# Patient Record
Sex: MALE | Race: ASIAN | NOT HISPANIC OR LATINO | URBAN - METROPOLITAN AREA
[De-identification: names, ages, dates, MRNs, and addresses within clinical notes are randomized per-mention and may not be internally consistent; named-entity substitution may affect disease eponyms.]

---

## 2018-11-15 ENCOUNTER — EMERGENCY (EMERGENCY)
Facility: HOSPITAL | Age: 33
LOS: 1 days | Discharge: HOME | End: 2018-11-15
Attending: EMERGENCY MEDICINE
Payer: COMMERCIAL

## 2018-11-15 VITALS
HEIGHT: 65 IN | DIASTOLIC BLOOD PRESSURE: 105 MMHG | HEART RATE: 102 BPM | SYSTOLIC BLOOD PRESSURE: 146 MMHG | TEMPERATURE: 98 F | RESPIRATION RATE: 16 BRPM | WEIGHT: 212.97 LBS

## 2018-11-15 VITALS
HEART RATE: 106 BPM | OXYGEN SATURATION: 98 % | RESPIRATION RATE: 16 BRPM | SYSTOLIC BLOOD PRESSURE: 155 MMHG | TEMPERATURE: 98 F | DIASTOLIC BLOOD PRESSURE: 100 MMHG

## 2018-11-15 PROCEDURE — 99284 EMERGENCY DEPT VISIT MOD MDM: CPT

## 2018-11-15 PROCEDURE — 99283 EMERGENCY DEPT VISIT LOW MDM: CPT

## 2018-11-15 RX ORDER — LOSARTAN POTASSIUM 100 MG/1
50 TABLET, FILM COATED ORAL DAILY
Qty: 0 | Refills: 0 | Status: DISCONTINUED | OUTPATIENT
Start: 2018-11-15 | End: 2018-11-19

## 2018-11-15 RX ORDER — LOSARTAN POTASSIUM 100 MG/1
0 TABLET, FILM COATED ORAL
Qty: 0 | Refills: 0 | COMMUNITY

## 2018-11-15 RX ORDER — AMLODIPINE BESYLATE 2.5 MG/1
0 TABLET ORAL
Qty: 0 | Refills: 0 | COMMUNITY

## 2018-11-15 RX ORDER — AMLODIPINE BESYLATE 2.5 MG/1
5 TABLET ORAL ONCE
Qty: 0 | Refills: 0 | Status: COMPLETED | OUTPATIENT
Start: 2018-11-15 | End: 2018-11-15

## 2018-11-15 RX ADMIN — LOSARTAN POTASSIUM 50 MILLIGRAM(S): 100 TABLET, FILM COATED ORAL at 10:33

## 2018-11-15 RX ADMIN — AMLODIPINE BESYLATE 5 MILLIGRAM(S): 2.5 TABLET ORAL at 10:33

## 2018-11-15 NOTE — ED ADULT NURSE NOTE - NSIMPLEMENTINTERV_GEN_ALL_ED
Implemented All Universal Safety Interventions:  [place] to call system. Call bell, personal items and telephone within reach. Instruct patient to call for assistance. Room bathroom lighting operational. Non-slip footwear when patient is off stretcher. Physically safe environment: no spills, clutter or unnecessary equipment. Stretcher in lowest position, wheels locked, appropriate side rails in place.

## 2018-11-15 NOTE — ED PROVIDER NOTE - PLAN OF CARE
1. Follow up with employee health tomorrow [phone]. Additionally please follow up with your PMD in the next 1-2 days.  2. Rest, stay hydrated. Continue all your current home medications as previously prescribed.   3. Return to the ED immediately if you develop any fever/chills, weakness, nausea/vomiting, abdominal pain or any other concerning symptoms.

## 2018-11-15 NOTE — ED PROVIDER NOTE - CARE PLAN
Principal Discharge DX:	Needle stick injury of finger of left hand, initial encounter  Assessment and plan of treatment:	1. Follow up with employee health tomorrow [phone]. Additionally please follow up with your PMD in the next 1-2 days.  2. Rest, stay hydrated. Continue all your current home medications as previously prescribed.   3. Return to the ED immediately if you develop any fever/chills, weakness, nausea/vomiting, abdominal pain or any other concerning symptoms.

## 2018-11-15 NOTE — ED PROVIDER NOTE - MEDICAL DECISION MAKING DETAILS
Attending MD Perea: 34 yo male with PMH for HTN works as a PCA on [employer], he was withdrawing butterfly and was try close safety with one hand and lost  and needle poked in L wrist.  Puncture wound present.  No active bleeding. Incident occurred at 9am.

## 2018-11-15 NOTE — ED ADULT NURSE REASSESSMENT NOTE - NS ED NURSE REASSESS COMMENT FT1
Pt's B/P 155/100, pulse 106, Dr. Baudilio Wu informed, pt denies headache, dizziness, vision changes.  Pt discharged from the ER, reports he's returning to work on [address] as per MD.  Pt reports he will have his b/p  and pulse checked on [address].  No acute distress noted.  Pt left under stable condition.

## 2018-11-15 NOTE — ED PROVIDER NOTE - ATTENDING CONTRIBUTION TO CARE
Attending MD Perea:   I personally have seen and examined this patient.  Physician assistant note reviewed and agree on plan of care and except where noted.  See MDM for details.

## 2018-11-15 NOTE — ED PROVIDER NOTE - SKIN, MLM
+small puncture wound to dorsum of radial wrist. Otherwise, skin normal color for race, warm, dry and intact. No evidence of rash.

## 2018-11-15 NOTE — ED PROVIDER NOTE - OBJECTIVE STATEMENT
34 yo male presents to ED c/o needlestick. 32 yo male PMHx HTN presents to ED c/o needlestick to L wrist this AM.    Tetanus UTD. 32 yo male PMHx HTN presents to ED c/o needlestick to L wrist this AM. 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 32 yo male PMHx HTN presents to ED c/o needlestick to L wrist this AM. 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  Tetanus UTD. 34 yo male PMHx HTN presents to ED c/o needlestick to L wrist this AM. 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 Noted minimal blood from his wrist, now has small puncture wound. States all his vaccines are up to date. Denies abdominal pain, fever/chills, cp, sob, dizziness, weakness, fatigue, n/v.  Tetanus UTD.

## 2018-11-15 NOTE — ED ADULT NURSE NOTE - OBJECTIVE STATEMENT
4 yo male PMHx HTN presents to ED c/o needlestick to L wrist this AM. 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  Pt reports he was drawing blood from a patient using a butterfly needle and while retracting the needle with his one hand he lost  and was stuck in his left wrist.  Pt reports he washed the area immediately.  Tetanus UTD.  Puncture wound present.  No active bleeding.

## 2018-11-15 NOTE — ED PROVIDER NOTE - PROGRESS NOTE DETAILS
Obtained consent for HIV testing from pt. Pt offered prophylactic PEP but refused. Source patient's chart reviewed, no HIV testing documented. Appropriate packet given to patient to give to nurse care taking for source patient. - Baudilio Wu PA-C Obtained consent for HIV testing from pt. Pt offered prophylactic PEP but refused. Source patient: MRN: [id number]. Appropriate packet given to patient to give to nurse care taking for source patient. MD aware. - Baudilio Wu PA-C Obtained consent for HIV testing from pt. Pt offered prophylactic PEP but refused. Source patient: MRN: [id number]. Appropriate packet given to patient to give to nurse care taking for source patient. Patient advised he must f/u with [facility] tomorrow, number provided. MD aware. - Baudilio Wu PA-C Obtained consent for HIV testing from pt. Pt offered prophylactic PEP but refused. Source patient: MRN: [id number]. Appropriate packet given to patient to give to nurse care taking for source patient. Patient advised he must f/u with [facility] tomorrow, number provided. HTN noted, patient did not take his home medication this AM and forgot them at home to take now. Gave home PO medication dosage. MD aware. - Baudilio Wu PA-C

## 2019-01-14 PROBLEM — I10 ESSENTIAL (PRIMARY) HYPERTENSION: Chronic | Status: ACTIVE | Noted: 2018-11-15

## 2019-01-15 PROBLEM — Z00.00 ENCOUNTER FOR PREVENTIVE HEALTH EXAMINATION: Status: ACTIVE | Noted: 2019-01-15

## 2019-01-18 ENCOUNTER — TRANSCRIPTION ENCOUNTER (OUTPATIENT)
Age: 34
End: 2019-01-18

## 2019-01-18 ENCOUNTER — APPOINTMENT (OUTPATIENT)
Dept: ENDOCRINOLOGY | Facility: CLINIC | Age: 34
End: 2019-01-18
Payer: COMMERCIAL

## 2019-01-18 ENCOUNTER — LABORATORY RESULT (OUTPATIENT)
Age: 34
End: 2019-01-18

## 2019-01-18 VITALS
SYSTOLIC BLOOD PRESSURE: 111 MMHG | BODY MASS INDEX: 33.22 KG/M2 | TEMPERATURE: 98.1 F | HEART RATE: 101 BPM | OXYGEN SATURATION: 98 % | HEIGHT: 64.57 IN | DIASTOLIC BLOOD PRESSURE: 72 MMHG | WEIGHT: 197 LBS

## 2019-01-18 DIAGNOSIS — Z82.49 FAMILY HISTORY OF ISCHEMIC HEART DISEASE AND OTHER DISEASES OF THE CIRCULATORY SYSTEM: ICD-10-CM

## 2019-01-18 DIAGNOSIS — Z87.19 PERSONAL HISTORY OF OTHER DISEASES OF THE DIGESTIVE SYSTEM: ICD-10-CM

## 2019-01-18 DIAGNOSIS — N20.0 CALCULUS OF KIDNEY: ICD-10-CM

## 2019-01-18 DIAGNOSIS — Z86.39 PERSONAL HISTORY OF OTHER ENDOCRINE, NUTRITIONAL AND METABOLIC DISEASE: ICD-10-CM

## 2019-01-18 LAB — GLUCOSE BLDC GLUCOMTR-MCNC: 213

## 2019-01-18 PROCEDURE — 36415 COLL VENOUS BLD VENIPUNCTURE: CPT

## 2019-01-18 PROCEDURE — 99204 OFFICE O/P NEW MOD 45 MIN: CPT | Mod: 25

## 2019-01-18 RX ORDER — GEMFIBROZIL 600 MG/1
600 TABLET, FILM COATED ORAL
Refills: 0 | Status: ACTIVE | COMMUNITY

## 2019-01-18 NOTE — ASSESSMENT
[FreeTextEntry1] : This is a 33-year-old male with newly diagnosed uncontrolled type 2 diabetes mellitus, obesity, hypertriglyceridemia-induced pancreatitis, hypertension, hyperlipidemia, here for initial visit. \par 1. Type 2 diabetes mellitus\par -continue with basal/bolus insulin for now\par -send blood glucose logs to the office weekly for adjustments in insulin doses\par -Appointment with CDE next week\par -He is interested in Dexcom, will inquire about coverage\par -Signs, symptoms, and treatment of hypoglycemia were reviewed. He was provided with glucose tablets\par -waiting for hemoglobin A1c from PCPs office\par -advised to self monitor his blood glucose 4 times a day\par -Will check 24 urine cortisol to evaluate for Cushing syndrome given hypertension, obesity, type 2 diabetes mellitus\par -wi'll consider initiating metformin after review of blood work\par -check CMP, urine microalbumin, islet cell antibodies, agustin antibodies\par -avoid GLP1 agonist and DPP4 inhibitor with history of pancreatitis\par -referred to ophthalmologist to evaluate for retinopathy\par 2. Hypertriglyceridemia/HLD\par -check lipid panel\par 3.HTN \par -controlled  [Carbohydrate Consistent Diet] : carbohydrate consistent diet [Hypoglycemia Management] : hypoglycemia management [Importance of Diet and Exercise] : importance of diet and exercise to improve glycemic control, achieve weight loss and improve cardiovascular health [Retinopathy Screening] : Patient was referred to ophthalmology for retinopathy screening

## 2019-01-18 NOTE — HISTORY OF PRESENT ILLNESS
[FreeTextEntry1] : CC: Diabetes\par This is a 33-year-old male with newly diagnosed uncontrolled type 2 diabetes mellitus, obesity, hypertriglyceridemia-induced pancreatitis, hypertension, hyperlipidemia, here for initial visit. He was hospitalized at [facility] from January 4, 2019- January 11, 2019 for pancreatitis requiring ICU stay. He denies a prior history of hyperlipidemia or diabetes mellitus. His last physical exam before this hospitalization was 2 years ago.\par He was started on basal/bolus insulin in the hospital. He is currently on Lantus 30 units qhs and Humalog 120-150 3 units, 150-200 6 units, 201-250 8 units. He denies hypoglycemia. He denies microvascular complications from diabetes.

## 2019-01-18 NOTE — REVIEW OF SYSTEMS
[Recent Weight Loss (___ Lbs)] : recent [unfilled] ~Ulb weight loss [All other systems negative] : All other systems negative

## 2019-01-21 LAB
ALBUMIN SERPL ELPH-MCNC: 4.8 G/DL
ALP BLD-CCNC: 160 U/L
ALT SERPL-CCNC: 33 U/L
ANION GAP SERPL CALC-SCNC: 14 MMOL/L
AST SERPL-CCNC: 30 U/L
BILIRUB SERPL-MCNC: 1.1 MG/DL
BUN SERPL-MCNC: 20 MG/DL
CALCIUM SERPL-MCNC: 9.6 MG/DL
CHLORIDE SERPL-SCNC: 102 MMOL/L
CHOLEST SERPL-MCNC: 233 MG/DL
CHOLEST/HDLC SERPL: 10.1 RATIO
CO2 SERPL-SCNC: 23 MMOL/L
CREAT SERPL-MCNC: 1.02 MG/DL
CREAT SPEC-SCNC: 203 MG/DL
GLUCOSE SERPL-MCNC: 181 MG/DL
HDLC SERPL-MCNC: 23 MG/DL
LDLC SERPL CALC-MCNC: 142 MG/DL
MICROALBUMIN 24H UR DL<=1MG/L-MCNC: 4.6 MG/DL
MICROALBUMIN/CREAT 24H UR-RTO: 23 MG/G
POTASSIUM SERPL-SCNC: 4.7 MMOL/L
PROT SERPL-MCNC: 7.6 G/DL
SODIUM SERPL-SCNC: 139 MMOL/L
TRIGL SERPL-MCNC: 341 MG/DL

## 2019-01-22 ENCOUNTER — APPOINTMENT (OUTPATIENT)
Dept: ENDOCRINOLOGY | Facility: CLINIC | Age: 34
End: 2019-01-22
Payer: COMMERCIAL

## 2019-01-22 PROCEDURE — 99215 OFFICE O/P EST HI 40 MIN: CPT

## 2019-01-23 LAB
GAD65 AB SER-MCNC: 0 NMOL/L
PANC ISLET CELL AB SER QL: NORMAL

## 2019-01-28 LAB
CORTIS 24H UR-MCNC: 24 H
CORTIS 24H UR-MRATE: 20 MCG/24 H
SPECIMEN VOL 24H UR: 1450 ML

## 2019-02-05 ENCOUNTER — APPOINTMENT (OUTPATIENT)
Dept: ENDOCRINOLOGY | Facility: CLINIC | Age: 34
End: 2019-02-05
Payer: COMMERCIAL

## 2019-02-05 VITALS — WEIGHT: 200.5 LBS | HEIGHT: 65 IN | BODY MASS INDEX: 33.41 KG/M2

## 2019-02-05 LAB — GLUCOSE BLDC GLUCOMTR-MCNC: 99

## 2019-02-05 PROCEDURE — 99215 OFFICE O/P EST HI 40 MIN: CPT

## 2019-02-14 ENCOUNTER — NON-APPOINTMENT (OUTPATIENT)
Age: 34
End: 2019-02-14

## 2019-02-14 ENCOUNTER — APPOINTMENT (OUTPATIENT)
Dept: OPHTHALMOLOGY | Facility: CLINIC | Age: 34
End: 2019-02-14
Payer: COMMERCIAL

## 2019-02-14 PROCEDURE — 92004 COMPRE OPH EXAM NEW PT 1/>: CPT

## 2019-02-25 ENCOUNTER — APPOINTMENT (OUTPATIENT)
Dept: OPHTHALMOLOGY | Facility: CLINIC | Age: 34
End: 2019-02-25

## 2019-03-01 ENCOUNTER — APPOINTMENT (OUTPATIENT)
Dept: ENDOCRINOLOGY | Facility: CLINIC | Age: 34
End: 2019-03-01

## 2019-03-29 ENCOUNTER — APPOINTMENT (OUTPATIENT)
Dept: ENDOCRINOLOGY | Facility: CLINIC | Age: 34
End: 2019-03-29

## 2019-05-03 ENCOUNTER — APPOINTMENT (OUTPATIENT)
Dept: ENDOCRINOLOGY | Facility: CLINIC | Age: 34
End: 2019-05-03

## 2019-08-15 ENCOUNTER — TRANSCRIPTION ENCOUNTER (OUTPATIENT)
Age: 34
End: 2019-08-15

## 2019-08-15 ENCOUNTER — APPOINTMENT (OUTPATIENT)
Dept: ENDOCRINOLOGY | Facility: CLINIC | Age: 34
End: 2019-08-15
Payer: COMMERCIAL

## 2019-08-15 VITALS
SYSTOLIC BLOOD PRESSURE: 135 MMHG | HEART RATE: 81 BPM | OXYGEN SATURATION: 97 % | HEIGHT: 65 IN | DIASTOLIC BLOOD PRESSURE: 89 MMHG | TEMPERATURE: 98.2 F | BODY MASS INDEX: 37.49 KG/M2 | WEIGHT: 225 LBS

## 2019-08-15 DIAGNOSIS — Z86.39 PERSONAL HISTORY OF OTHER ENDOCRINE, NUTRITIONAL AND METABOLIC DISEASE: ICD-10-CM

## 2019-08-15 LAB — GLUCOSE BLDC GLUCOMTR-MCNC: 173

## 2019-08-15 PROCEDURE — 99214 OFFICE O/P EST MOD 30 MIN: CPT | Mod: 25

## 2019-08-15 PROCEDURE — 36415 COLL VENOUS BLD VENIPUNCTURE: CPT

## 2019-08-15 RX ORDER — LOSARTAN POTASSIUM 50 MG/1
50 TABLET, FILM COATED ORAL
Refills: 0 | Status: DISCONTINUED | COMMUNITY
End: 2019-08-15

## 2019-08-15 RX ORDER — PRAVASTATIN SODIUM 80 MG/1
TABLET ORAL
Refills: 0 | Status: DISCONTINUED | COMMUNITY
End: 2019-08-15

## 2019-08-15 RX ORDER — ROSUVASTATIN CALCIUM 40 MG/1
40 TABLET, FILM COATED ORAL
Refills: 0 | Status: ACTIVE | COMMUNITY
Start: 2019-08-15

## 2019-08-15 NOTE — PHYSICAL EXAM
[Alert] : alert [No Acute Distress] : no acute distress [Well Nourished] : well nourished [Healthy Appearance] : healthy appearance [Well Developed] : well developed [Normal Voice/Communication] : normal voice communication [Normal Sclera/Conjunctiva] : normal sclera/conjunctiva [No Proptosis] : no proptosis [Normal Oropharynx] : the oropharynx was normal [No Neck Mass] : no neck mass was observed [Supple] : the neck was supple [No LAD] : no lymphadenopathy [Thyroid Not Enlarged] : the thyroid was not enlarged [No Thyroid Nodules] : there were no palpable thyroid nodules [No Respiratory Distress] : no respiratory distress [Normal Rate and Effort] : normal respiratory rhythm and effort [No Accessory Muscle Use] : no accessory muscle use [Normal Rate] : heart rate was normal  [Clear to Auscultation] : lungs were clear to auscultation bilaterally [Normal S1, S2] : normal S1 and S2 [Regular Rhythm] : with a regular rhythm [Pedal Pulses Normal] : the pedal pulses are present [No Edema] : there was no peripheral edema [Spine Straight] : spine straight [Not Distended] : not distended [No Stigmata of Cushings Syndrome] : no stigmata of cushings syndrome [Normal Gait] : normal gait [No Involuntary Movements] : no involuntary movements were seen [No Clubbing, Cyanosis] : no clubbing  or cyanosis of the fingernails [Left Foot Was Examined] : left foot ~C was examined [Right Foot Was Examined] : right foot ~C was examined [Normal Sensation on Monofilament Testing] : normal sensation on monofilament testing of lower extremities [Normal Insight/Judgement] : insight and judgment were intact [Normal Affect] : the affect was normal [Normal Mood] : the mood was normal [Acanthosis Nigricans] : no acanthosis nigricans [Diminished Throughout Both Feet] : normal tactile sensation with monofilament testing throughout both feet [de-identified] : no lipohypertrophy

## 2019-08-15 NOTE — HISTORY OF PRESENT ILLNESS
[FreeTextEntry1] : CC: Diabetes\par This is a 34-year-old male with type 2 diabetes mellitus, obesity, hypertriglyceridemia-induced pancreatitis, hypertension, hyperlipidemia, here for follow up. He was hospitalized at [facility] from January 4, 2019- January 11, 2019 for pancreatitis requiring ICU stay. He denies a prior history of hyperlipidemia or diabetes mellitus. His last physical exam before this hospitalization was 2 years ago.\par He was started on basal/bolus insulin in the hospital. He is currently on Lantus 20 units qhs and Humalog 3 units qac. He often skips insulin doses because it is tedious per pt. He reports FS glucose in 150-200s but is not consistent in checking. He went his ophthalmologist in April 2019 and denies retinopathy. He denies neuropathy. There is no known nephropathy.\par \par 24 hour urine cortisol normal. \par CAROLYN ab, islet cell ab negative.  [Hypoglycemia] : not hypoglycemic

## 2019-08-15 NOTE — ASSESSMENT
[FreeTextEntry1] : This is a 34-year-old male with type 2 diabetes mellitus, obesity, hypertriglyceridemia-induced pancreatitis, hypertension, hyperlipidemia, here for follow up. \par 1. Type 2 diabetes mellitus\par Check HbA1C.\par Continue with basal/bolus insulin for now. \par Bring BG logs to appt with CDE next week. \par He is interested in Dexcom, will inquire about coverage\par Advised to self monitor his blood glucose 4 times a day\par Will consider initiating metformin after review of blood work\par Check urine microwave and to evaluate for nephropathy.\par He saw his ophthalmologist in April 2019 and denies retinopathy.\par He denies neuropathy.\par Check vitamin B12 as we may be able to initiate metformin pending blood results.\par Avoid GLP1 agonist and DPP4 inhibitor with history of pancreatitis\par 2. Hypertriglyceridemia/HLD\par Check lipid panel\par 3.HTN \par Check BP at home and call with readings as BP is not at goal. \par 4. Obesity\par LSM.

## 2019-08-16 ENCOUNTER — RESULT REVIEW (OUTPATIENT)
Age: 34
End: 2019-08-16

## 2019-08-16 DIAGNOSIS — R79.89 OTHER SPECIFIED ABNORMAL FINDINGS OF BLOOD CHEMISTRY: ICD-10-CM

## 2019-08-16 LAB
ALBUMIN SERPL ELPH-MCNC: 5.1 G/DL
ALP BLD-CCNC: 117 U/L
ALT SERPL-CCNC: 45 U/L
ANION GAP SERPL CALC-SCNC: 16 MMOL/L
AST SERPL-CCNC: 27 U/L
BILIRUB SERPL-MCNC: 0.6 MG/DL
BUN SERPL-MCNC: 19 MG/DL
CALCIUM SERPL-MCNC: 9.7 MG/DL
CHLORIDE SERPL-SCNC: 99 MMOL/L
CO2 SERPL-SCNC: 26 MMOL/L
CREAT SERPL-MCNC: 1.04 MG/DL
CREAT SPEC-SCNC: 243 MG/DL
FRUCTOSAMINE SERPL-MCNC: 371 UMOL/L
GLUCOSE SERPL-MCNC: 188 MG/DL
MICROALBUMIN 24H UR DL<=1MG/L-MCNC: 9.2 MG/DL
MICROALBUMIN/CREAT 24H UR-RTO: 38 MG/G
POTASSIUM SERPL-SCNC: 3.9 MMOL/L
PROT SERPL-MCNC: 7.3 G/DL
SODIUM SERPL-SCNC: 141 MMOL/L
VIT B12 SERPL-MCNC: 711 PG/ML

## 2019-08-19 LAB
25(OH)D3 SERPL-MCNC: 16.9 NG/ML
CORTIS SERPL-MCNC: 16.5 UG/DL
IGF-1 INTERP: NORMAL
IGF-I BLD-MCNC: 169 NG/ML
PROLACTIN SERPL-MCNC: 10.1 NG/ML
T4 FREE SERPL-MCNC: 1.3 NG/DL
TSH SERPL-ACNC: 8.71 UIU/ML

## 2019-08-20 ENCOUNTER — APPOINTMENT (OUTPATIENT)
Dept: ENDOCRINOLOGY | Facility: CLINIC | Age: 34
End: 2019-08-20
Payer: COMMERCIAL

## 2019-08-20 ENCOUNTER — MEDICATION RENEWAL (OUTPATIENT)
Age: 34
End: 2019-08-20

## 2019-08-20 LAB
THYROGLOB AB SERPL-ACNC: <20 IU/ML
THYROPEROXIDASE AB SERPL IA-ACNC: <10 IU/ML

## 2019-08-20 PROCEDURE — 99215 OFFICE O/P EST HI 40 MIN: CPT

## 2019-08-21 ENCOUNTER — TRANSCRIPTION ENCOUNTER (OUTPATIENT)
Age: 34
End: 2019-08-21

## 2019-08-30 RX ORDER — BLOOD-GLUCOSE SENSOR
EACH MISCELLANEOUS
Qty: 3 | Refills: 11 | Status: ACTIVE | COMMUNITY
Start: 2019-08-16 | End: 1900-01-01

## 2019-08-30 RX ORDER — BLOOD-GLUCOSE TRANSMITTER
EACH MISCELLANEOUS
Qty: 1 | Refills: 3 | Status: ACTIVE | COMMUNITY
Start: 2019-08-16 | End: 1900-01-01

## 2019-08-30 RX ORDER — BLOOD-GLUCOSE,RECEIVER,CONT
EACH MISCELLANEOUS
Qty: 1 | Refills: 0 | Status: ACTIVE | COMMUNITY
Start: 2019-08-16 | End: 1900-01-01

## 2019-09-18 ENCOUNTER — MEDICATION RENEWAL (OUTPATIENT)
Age: 34
End: 2019-09-18

## 2019-09-18 RX ORDER — INSULIN GLARGINE 100 [IU]/ML
100 INJECTION, SOLUTION SUBCUTANEOUS
Refills: 0 | Status: DISCONTINUED | COMMUNITY
End: 2019-09-18

## 2019-09-18 RX ORDER — INSULIN LISPRO 200 [IU]/ML
200 INJECTION, SOLUTION SUBCUTANEOUS
Qty: 5 | Refills: 0 | Status: ACTIVE | COMMUNITY
Start: 2019-09-18 | End: 1900-01-01

## 2019-09-18 RX ORDER — INSULIN GLARGINE 100 [IU]/ML
100 INJECTION, SOLUTION SUBCUTANEOUS
Qty: 5 | Refills: 0 | Status: ACTIVE | COMMUNITY
Start: 2019-09-18 | End: 1900-01-01

## 2019-09-18 RX ORDER — AMLODIPINE BESYLATE 10 MG/1
10 TABLET ORAL DAILY
Qty: 90 | Refills: 0 | Status: ACTIVE | COMMUNITY
Start: 1900-01-01 | End: 1900-01-01

## 2019-09-18 RX ORDER — ELECTROLYTES/DEXTROSE
32G X 4 MM SOLUTION, ORAL ORAL
Qty: 4 | Refills: 3 | Status: ACTIVE | COMMUNITY
Start: 2019-03-01 | End: 1900-01-01

## 2019-09-18 RX ORDER — METFORMIN ER 500 MG 500 MG/1
500 TABLET ORAL
Qty: 180 | Refills: 0 | Status: ACTIVE | COMMUNITY
Start: 2019-08-20 | End: 1900-01-01

## 2019-09-18 RX ORDER — INSULIN LISPRO 100 [IU]/ML
100 INJECTION, SOLUTION INTRAVENOUS; SUBCUTANEOUS
Refills: 0 | Status: DISCONTINUED | COMMUNITY
End: 2019-09-18

## 2019-09-26 ENCOUNTER — APPOINTMENT (OUTPATIENT)
Dept: ENDOCRINOLOGY | Facility: CLINIC | Age: 34
End: 2019-09-26
Payer: COMMERCIAL

## 2019-09-26 VITALS
HEART RATE: 91 BPM | WEIGHT: 217 LBS | DIASTOLIC BLOOD PRESSURE: 88 MMHG | TEMPERATURE: 98.1 F | OXYGEN SATURATION: 97 % | HEIGHT: 65 IN | BODY MASS INDEX: 36.15 KG/M2 | SYSTOLIC BLOOD PRESSURE: 142 MMHG

## 2019-09-26 DIAGNOSIS — E78.5 HYPERLIPIDEMIA, UNSPECIFIED: ICD-10-CM

## 2019-09-26 DIAGNOSIS — E11.65 TYPE 2 DIABETES MELLITUS WITH HYPERGLYCEMIA: ICD-10-CM

## 2019-09-26 DIAGNOSIS — E66.9 OBESITY, UNSPECIFIED: ICD-10-CM

## 2019-09-26 DIAGNOSIS — I10 ESSENTIAL (PRIMARY) HYPERTENSION: ICD-10-CM

## 2019-09-26 DIAGNOSIS — E78.1 PURE HYPERGLYCERIDEMIA: ICD-10-CM

## 2019-09-26 PROCEDURE — 99214 OFFICE O/P EST MOD 30 MIN: CPT

## 2019-09-26 NOTE — HISTORY OF PRESENT ILLNESS
[FreeTextEntry1] : CC: Diabetes\par This is a 34-year-old male with type 2 diabetes mellitus, obesity, hypertriglyceridemia-induced pancreatitis, hypertension, hyperlipidemia, here for follow up. \par \par He was hospitalized at [facility] from January 4, 2019- January 11, 2019 for pancreatitis requiring ICU stay. He was started on basal/bolus insulin in the hospital. \par He reports non compliance with insulin and takes it sporadically. He reports FS glucose in 90-120s fasting. He went to his ophthalmologist in April 2019 and denies retinopathy. He denies neuropathy. There is no known nephropathy.\par \par 24 hour urine cortisol normal. \par CAROLYN ab, islet cell ab negative.

## 2019-09-26 NOTE — PHYSICAL EXAM
[Alert] : alert [No Acute Distress] : no acute distress [Well Nourished] : well nourished [Well Developed] : well developed [Healthy Appearance] : healthy appearance [Normal Voice/Communication] : normal voice communication [Normal Sclera/Conjunctiva] : normal sclera/conjunctiva [No Proptosis] : no proptosis [Normal Oropharynx] : the oropharynx was normal [No Neck Mass] : no neck mass was observed [No LAD] : no lymphadenopathy [Supple] : the neck was supple [Thyroid Not Enlarged] : the thyroid was not enlarged [No Thyroid Nodules] : there were no palpable thyroid nodules [Normal Rate and Effort] : normal respiratory rhythm and effort [No Respiratory Distress] : no respiratory distress [No Accessory Muscle Use] : no accessory muscle use [Clear to Auscultation] : lungs were clear to auscultation bilaterally [Normal S1, S2] : normal S1 and S2 [Normal Rate] : heart rate was normal  [Pedal Pulses Normal] : the pedal pulses are present [Regular Rhythm] : with a regular rhythm [Not Distended] : not distended [No Edema] : there was no peripheral edema [Spine Straight] : spine straight [No Stigmata of Cushings Syndrome] : no stigmata of cushings syndrome [Normal Gait] : normal gait [No Involuntary Movements] : no involuntary movements were seen [No Clubbing, Cyanosis] : no clubbing  or cyanosis of the fingernails [Right Foot Was Examined] : right foot ~C was examined [Acanthosis Nigricans] : no acanthosis nigricans [Left Foot Was Examined] : left foot ~C was examined [Diminished Throughout Both Feet] : normal tactile sensation with monofilament testing throughout both feet [Normal Insight/Judgement] : insight and judgment were intact [Normal Sensation on Monofilament Testing] : normal sensation on monofilament testing of lower extremities [Normal Mood] : the mood was normal [Normal Affect] : the affect was normal [de-identified] : no lipohypertrophy

## 2019-09-26 NOTE — ASSESSMENT
[FreeTextEntry1] : This is a 34-year-old male with type 2 diabetes mellitus, obesity, hypertriglyceridemia-induced pancreatitis, hypertension, hyperlipidemia, here for follow up. \par 1. Type 2 diabetes mellitus\par  HbA1c from PCP on 9/16/19 was 6.3%.\par Cont with metformin for now. \par Bring BG logs to appt with CDE. \par Dexcom Rx has been sent and he is awaiting delivery. \par Check repeat urine microalbumin to evaluate for nephropathy as last one was eleavted.\par He saw his ophthalmologist in April 2019 and denies retinopathy.\par He denies neuropathy.\par Recent Vitamin B12  was normal. \par Avoid GLP1 agonist and DPP4 inhibitor with history of pancreatitis\par 2. Hypertriglyceridemia/HLD\par Recent triglycerides and LDL from PCP are normal. \par Cont statin. \par 3.HTN \par Check BP at home and call with readings as BP is not at goal. \par 4. Obesity\par LSM.

## 2019-09-27 ENCOUNTER — RESULT CHARGE (OUTPATIENT)
Age: 34
End: 2019-09-27

## 2019-09-27 LAB
CREAT SPEC-SCNC: 296 MG/DL
MICROALBUMIN 24H UR DL<=1MG/L-MCNC: 20.5 MG/DL
MICROALBUMIN/CREAT 24H UR-RTO: 69 MG/G

## 2019-09-27 RX ORDER — LOSARTAN POTASSIUM AND HYDROCHLOROTHIAZIDE 12.5; 5 MG/1; MG/1
50-12.5 TABLET ORAL DAILY
Qty: 90 | Refills: 0 | Status: DISCONTINUED | COMMUNITY
Start: 2019-08-15 | End: 2019-09-27

## 2019-09-27 RX ORDER — LOSARTAN POTASSIUM AND HYDROCHLOROTHIAZIDE 12.5; 1 MG/1; MG/1
100-12.5 TABLET ORAL DAILY
Qty: 90 | Refills: 1 | Status: ACTIVE | COMMUNITY
Start: 2019-09-27 | End: 1900-01-01

## 2019-10-10 ENCOUNTER — APPOINTMENT (OUTPATIENT)
Dept: ENDOCRINOLOGY | Facility: CLINIC | Age: 34
End: 2019-10-10

## 2019-11-07 ENCOUNTER — APPOINTMENT (OUTPATIENT)
Dept: ENDOCRINOLOGY | Facility: CLINIC | Age: 34
End: 2019-11-07

## 2020-04-26 ENCOUNTER — MESSAGE (OUTPATIENT)
Age: 35
End: 2020-04-26

## 2020-05-04 LAB
SARS-COV-2 IGG SERPL IA-ACNC: <0.1 INDEX
SARS-COV-2 IGG SERPL QL IA: NEGATIVE

## 2020-05-07 ENCOUNTER — TRANSCRIPTION ENCOUNTER (OUTPATIENT)
Age: 35
End: 2020-05-07

## 2020-05-09 ENCOUNTER — APPOINTMENT (OUTPATIENT)
Dept: DISASTER EMERGENCY | Facility: HOSPITAL | Age: 35
End: 2020-05-09

## 2020-05-24 ENCOUNTER — EMERGENCY (EMERGENCY)
Facility: HOSPITAL | Age: 35
LOS: 1 days | Discharge: HOME | End: 2020-05-24
Attending: EMERGENCY MEDICINE
Payer: COMMERCIAL

## 2020-05-24 VITALS
DIASTOLIC BLOOD PRESSURE: 92 MMHG | WEIGHT: 225.09 LBS | RESPIRATION RATE: 18 BRPM | HEART RATE: 107 BPM | TEMPERATURE: 99 F | SYSTOLIC BLOOD PRESSURE: 160 MMHG | HEIGHT: 65 IN | OXYGEN SATURATION: 98 %

## 2020-05-24 VITALS — RESPIRATION RATE: 18 BRPM | HEART RATE: 103 BPM | OXYGEN SATURATION: 97 %

## 2020-05-24 LAB
ALBUMIN SERPL ELPH-MCNC: 4.3 G/DL — SIGNIFICANT CHANGE UP (ref 3.3–5)
ALP SERPL-CCNC: 124 U/L — HIGH (ref 40–120)
ALT FLD-CCNC: 71 U/L — HIGH (ref 10–45)
ANION GAP SERPL CALC-SCNC: 16 MMOL/L — SIGNIFICANT CHANGE UP (ref 5–17)
APPEARANCE UR: CLEAR — SIGNIFICANT CHANGE UP
AST SERPL-CCNC: 52 U/L — HIGH (ref 10–40)
BACTERIA # UR AUTO: NEGATIVE — SIGNIFICANT CHANGE UP
BASE EXCESS BLDV CALC-SCNC: 2.9 MMOL/L — HIGH (ref -2–2)
BASOPHILS # BLD AUTO: 0.15 K/UL — SIGNIFICANT CHANGE UP (ref 0–0.2)
BASOPHILS NFR BLD AUTO: 1.5 % — SIGNIFICANT CHANGE UP (ref 0–2)
BILIRUB SERPL-MCNC: 0.8 MG/DL — SIGNIFICANT CHANGE UP (ref 0.2–1.2)
BILIRUB UR-MCNC: NEGATIVE — SIGNIFICANT CHANGE UP
BUN SERPL-MCNC: 14 MG/DL — SIGNIFICANT CHANGE UP (ref 7–23)
CA-I SERPL-SCNC: 1.26 MMOL/L — SIGNIFICANT CHANGE UP (ref 1.12–1.3)
CALCIUM SERPL-MCNC: 10.4 MG/DL — SIGNIFICANT CHANGE UP (ref 8.4–10.5)
CHLORIDE BLDV-SCNC: 97 MMOL/L — SIGNIFICANT CHANGE UP (ref 96–108)
CHLORIDE SERPL-SCNC: 94 MMOL/L — LOW (ref 96–108)
CO2 BLDV-SCNC: 31 MMOL/L — HIGH (ref 22–30)
CO2 SERPL-SCNC: 26 MMOL/L — SIGNIFICANT CHANGE UP (ref 22–31)
COLOR SPEC: SIGNIFICANT CHANGE UP
CREAT SERPL-MCNC: 0.94 MG/DL — SIGNIFICANT CHANGE UP (ref 0.5–1.3)
DIFF PNL FLD: NEGATIVE — SIGNIFICANT CHANGE UP
EOSINOPHIL # BLD AUTO: 0.73 K/UL — HIGH (ref 0–0.5)
EOSINOPHIL NFR BLD AUTO: 7.1 % — HIGH (ref 0–6)
EPI CELLS # UR: 0 /HPF — SIGNIFICANT CHANGE UP
GAS PNL BLDV: 137 MMOL/L — SIGNIFICANT CHANGE UP (ref 135–145)
GAS PNL BLDV: SIGNIFICANT CHANGE UP
GAS PNL BLDV: SIGNIFICANT CHANGE UP
GLUCOSE BLDV-MCNC: 319 MG/DL — HIGH (ref 70–99)
GLUCOSE SERPL-MCNC: 323 MG/DL — HIGH (ref 70–99)
GLUCOSE UR QL: ABNORMAL
HCO3 BLDV-SCNC: 29 MMOL/L — SIGNIFICANT CHANGE UP (ref 21–29)
HCT VFR BLD CALC: 48.2 % — SIGNIFICANT CHANGE UP (ref 39–50)
HCT VFR BLDA CALC: 50 % — SIGNIFICANT CHANGE UP (ref 39–50)
HGB BLD CALC-MCNC: 16.5 G/DL — SIGNIFICANT CHANGE UP (ref 13–17)
HGB BLD-MCNC: 15.7 G/DL — SIGNIFICANT CHANGE UP (ref 13–17)
HYALINE CASTS # UR AUTO: 0 /LPF — SIGNIFICANT CHANGE UP (ref 0–7)
IMM GRANULOCYTES NFR BLD AUTO: 1.1 % — SIGNIFICANT CHANGE UP (ref 0–1.5)
KETONES UR-MCNC: NEGATIVE — SIGNIFICANT CHANGE UP
LACTATE BLDV-MCNC: 2.5 MMOL/L — HIGH (ref 0.7–2)
LEUKOCYTE ESTERASE UR-ACNC: NEGATIVE — SIGNIFICANT CHANGE UP
LYMPHOCYTES # BLD AUTO: 2.39 K/UL — SIGNIFICANT CHANGE UP (ref 1–3.3)
LYMPHOCYTES # BLD AUTO: 23.1 % — SIGNIFICANT CHANGE UP (ref 13–44)
MCHC RBC-ENTMCNC: 29.2 PG — SIGNIFICANT CHANGE UP (ref 27–34)
MCHC RBC-ENTMCNC: 32.6 GM/DL — SIGNIFICANT CHANGE UP (ref 32–36)
MCV RBC AUTO: 89.6 FL — SIGNIFICANT CHANGE UP (ref 80–100)
MONOCYTES # BLD AUTO: 0.85 K/UL — SIGNIFICANT CHANGE UP (ref 0–0.9)
MONOCYTES NFR BLD AUTO: 8.2 % — SIGNIFICANT CHANGE UP (ref 2–14)
NEUTROPHILS # BLD AUTO: 6.11 K/UL — SIGNIFICANT CHANGE UP (ref 1.8–7.4)
NEUTROPHILS NFR BLD AUTO: 59 % — SIGNIFICANT CHANGE UP (ref 43–77)
NITRITE UR-MCNC: NEGATIVE — SIGNIFICANT CHANGE UP
NRBC # BLD: 0 /100 WBCS — SIGNIFICANT CHANGE UP (ref 0–0)
PCO2 BLDV: 53 MMHG — HIGH (ref 35–50)
PH BLDV: 7.36 — SIGNIFICANT CHANGE UP (ref 7.35–7.45)
PH UR: 6.5 — SIGNIFICANT CHANGE UP (ref 5–8)
PLATELET # BLD AUTO: 260 K/UL — SIGNIFICANT CHANGE UP (ref 150–400)
PO2 BLDV: 33 MMHG — SIGNIFICANT CHANGE UP (ref 25–45)
POTASSIUM BLDV-SCNC: 3.6 MMOL/L — SIGNIFICANT CHANGE UP (ref 3.5–5.3)
POTASSIUM SERPL-MCNC: 3.9 MMOL/L — SIGNIFICANT CHANGE UP (ref 3.5–5.3)
POTASSIUM SERPL-SCNC: 3.9 MMOL/L — SIGNIFICANT CHANGE UP (ref 3.5–5.3)
PROT SERPL-MCNC: 7.9 G/DL — SIGNIFICANT CHANGE UP (ref 6–8.3)
PROT UR-MCNC: ABNORMAL
RBC # BLD: 5.38 M/UL — SIGNIFICANT CHANGE UP (ref 4.2–5.8)
RBC # FLD: 12 % — SIGNIFICANT CHANGE UP (ref 10.3–14.5)
RBC CASTS # UR COMP ASSIST: 0 /HPF — SIGNIFICANT CHANGE UP (ref 0–4)
SAO2 % BLDV: 61 % — LOW (ref 67–88)
SARS-COV-2 RNA SPEC QL NAA+PROBE: SIGNIFICANT CHANGE UP
SODIUM SERPL-SCNC: 136 MMOL/L — SIGNIFICANT CHANGE UP (ref 135–145)
SP GR SPEC: 1.03 — HIGH (ref 1.01–1.02)
UROBILINOGEN FLD QL: NEGATIVE — SIGNIFICANT CHANGE UP
WBC # BLD: 10.34 K/UL — SIGNIFICANT CHANGE UP (ref 3.8–10.5)
WBC # FLD AUTO: 10.34 K/UL — SIGNIFICANT CHANGE UP (ref 3.8–10.5)
WBC UR QL: 0 /HPF — SIGNIFICANT CHANGE UP (ref 0–5)

## 2020-05-24 PROCEDURE — 82565 ASSAY OF CREATININE: CPT

## 2020-05-24 PROCEDURE — 82947 ASSAY GLUCOSE BLOOD QUANT: CPT

## 2020-05-24 PROCEDURE — 83605 ASSAY OF LACTIC ACID: CPT

## 2020-05-24 PROCEDURE — 82962 GLUCOSE BLOOD TEST: CPT

## 2020-05-24 PROCEDURE — 85014 HEMATOCRIT: CPT

## 2020-05-24 PROCEDURE — 80053 COMPREHEN METABOLIC PANEL: CPT

## 2020-05-24 PROCEDURE — 99283 EMERGENCY DEPT VISIT LOW MDM: CPT | Mod: 25

## 2020-05-24 PROCEDURE — 71045 X-RAY EXAM CHEST 1 VIEW: CPT | Mod: 26

## 2020-05-24 PROCEDURE — 85027 COMPLETE CBC AUTOMATED: CPT

## 2020-05-24 PROCEDURE — 82803 BLOOD GASES ANY COMBINATION: CPT

## 2020-05-24 PROCEDURE — 84295 ASSAY OF SERUM SODIUM: CPT

## 2020-05-24 PROCEDURE — 82330 ASSAY OF CALCIUM: CPT

## 2020-05-24 PROCEDURE — 84132 ASSAY OF SERUM POTASSIUM: CPT

## 2020-05-24 PROCEDURE — 71045 X-RAY EXAM CHEST 1 VIEW: CPT

## 2020-05-24 PROCEDURE — 82435 ASSAY OF BLOOD CHLORIDE: CPT

## 2020-05-24 PROCEDURE — 81001 URINALYSIS AUTO W/SCOPE: CPT

## 2020-05-24 PROCEDURE — 82010 KETONE BODYS QUAN: CPT

## 2020-05-24 PROCEDURE — 99284 EMERGENCY DEPT VISIT MOD MDM: CPT

## 2020-05-24 NOTE — ED PROVIDER NOTE - PATIENT PORTAL LINK FT
You can access the FollowMyHealth Patient Portal offered by [facility] by registering at the following website: [email or web address]. By joining [facility] [employer] [facility]’s FollowMyHealth portal, you will also be able to view your health information using other applications (apps) compatible with our system. You can access the FollowMyHealth Patient Portal offered by [facility] by registering at the following website: [email or web address]. By joining [facility] [employer]’s FollowMyHealth portal, you will also be able to view your health information using other applications (apps) compatible with our system.

## 2020-05-24 NOTE — ED PROVIDER NOTE - ATTENDING CONTRIBUTION TO CARE
36yo male pmh HTN, HLD, DM p/w fever 103 last night a/w cough x several days, improving with vaporub and Dayquil. Denies vomiting, diarrhea, dysuria, hematuria, rash, abd pain, chest pain. +right rib pain after 1 coughing episode. Lungs with slight end-inspiratory wheeze on left mid lung. No ronchi. Abd soft, nontender. Discussed COVID swab, CXR, labs as pt has had glucose high 200s to rule out infection induced DKA.

## 2020-05-24 NOTE — ED ADULT NURSE NOTE - OBJECTIVE STATEMENT
36 yo M aaox4 c/o of fever and cough. Sent to ED for Covid swab. Denies SOB, dizziness or weakness. No CP noted. Provider at bedside evaluating. NO sign of acute distress.

## 2020-05-24 NOTE — ED PROVIDER NOTE - NSFOLLOWUPINSTRUCTIONS_ED_ALL_ED_FT
You were seen and evaluated in the Emergency Department for your viral upper respiratory-like, possible COVID. You are stable for discharge, you should self-quarantine for 2 weeks for presumed COVID. Please see the attached COVID information packet for management of your symptoms, and more information on the next steps including your self-quarantine measures.     At this time your clinical evaluation and history do not demonstrate any acute, life-threatening medical conditions warranting emergent treatment. We strongly recommend you set up a follow up with your primary care doctor or with our Internal Medicine clinic (see contact information below).    [facility] Internal Medicine  General Internal Medicine  [address] [place] [address]  Phone: [phone]    Continue to maintain oral hydration, with plenty of fluids; take Tylenol (following the instructions on the medication insert information sheet for dosing) for fever control.  Do not exceed 3000mg in 24 hours of acetaminophen (Tylenol).     Should you develop new or worsening symptoms including but not limited to chest pain, shortness of breath, abdominal pain, fevers, vomiting, diarrhea - please return to the ED for immediate evaluation.      -------------    What is a coronavirus?  Coronaviruses are a large family of viruses that cause illnesses ranging from the common cold to more severe diseases such as Middle East Respiratory Syndrome (MERS) and Severe Acute Respiratory Syndrome (SARS).    What is Novel Coronavirus (COVID-19)?  The Centers for Disease Control and Prevention (CDC) is closely monitoring the outbreak caused by COVID-19. For the latest information about COVID-19, visit the CDC website at CDC.gov/Coronavirus    How are coronaviruses spread?  Coronaviruses can be transmitted from person to person, usually after close contact with an infected  person (for example, in a household, workplace, or healthcare setting), via droplets that become airborne after a cough or sneeze. These droplets can then infect a nearby person. Transmission can also occur by touching recently contaminated surfaces.    Is there a treatment for a COVID-19?  There is no specific treatment for disease caused by COVID-19. However, many of the symptoms can be treated based on the patient’s clinical condition. Supportive care for infected persons can be highly effective.    What are the symptoms of coronavirus infection?  It depends on the virus, but common signs include fever and/or respiratory symptoms such as cough and shortness of breath. In more severe cases, infection can cause pneumonia, severe acute respiratory syndrome, kidney failure and even death. Fortunately, most cases of COVID-19 have an illness no different than the influenza (flu), with a majority of these patients having mild symptoms and overall mortality which appears to be not much different than the flu.    What can I do to protect myself?  The best precautionary measures:  – washing your hands  – covering your cough  – disinfecting surfaces  – it is also advisable to avoid close contact with anyone showing symptoms of respiratory illness such as coughing and sneezing  – those with symptoms should wear a surgical mask when around others    What can I do to protect those around me?  If you have been identified as someone who may be infected with COVID-19, we recommend you follow the self-isolation procedures outlined on the following page to protect those around you and to limit the spread of this virus.    We recommend the below precautionary steps from now until 14 days from when you returned from your travel or date of your last known possible contact:    — Do not go to work, school or public areas. Avoid using public transportation, ridesharing or taxis.  — As much as possible, separate yourself from other people in your home. If you can, you should stay in a room and away from other people. Also, you should use a separate bathroom if available.  — Wear the supplied mask whenever you are around other people.  — If you have a non-urgent medical appointment, please reschedule for a later date. If the appointment is urgent, please call the health care provider and tell them that you are on self-isolation for possible COVID-19. This will help the health care provider’s office take steps to keep other people from getting infected or exposed. If you can reschedule routine appointments, do so.  — Wash your hands often with soap and water for at least 15 to 20 seconds or clean your hands with an alcohol-based hand  that contains 60 to 95% alcohol, covering all surfaces of your hands and rubbing them together until they feel dry. Soap and water should be used preferentially if hands are visibly dirty.  — Cover your mouth and nose with a tissue when you cough or sneeze. Throw used tissues in a lined trash can. Immediately wash your hands.  — Avoid touching your eyes, nose, and mouth with your hands.  — Avoid sharing personal household items. You should not share dishes, drinking glasses, cups, eating utensils, towels, or bedding with other people or pets in your home. After using these items, they should be washed thoroughly with soap and water.  — Clean and disinfect all “high-touch” surfaces every day. High touch surfaces include counters, tabletops, doorknobs, light switches, remote controls, bathroom fixtures, toilets, phones, keyboards, tablets, and bedside tables. Also, clean any surfaces that may have blood, stool, or body fluids on them.    ------------------------------------------  Information for patients who have received a COVID-19 test.    The COVID-19 (novel coronavirus) test  Results may take up to 7 days to become available.      If your result is positive, you will receive a phone call from one of our coronavirus specialists. While we will do our best to also call patients with a negative test result, the sheer volume of tests being performed may make this difficult to do in a timely fashion. If you haven’t heard from us within 5 days and you’d like to check on your results, you can check our Bycler jose or call one of our coronavirus specialists at [address] [place] [address] (available 24/7)    Please DO NOT call the site where you received the test to obtain your results.    If the test is positive -   You will continue home isolation until you are completely well, you have no fever, and it has been at least 14 days since your positive test. The health department in your city or Psychiatric hospital may also contact you with additional instructions.    If your test is negative -    You will be able to stop home isolation and resume standard precautions, similiar to how you would manage the common cold or flu.  If you have any questions, you can reach out to one of our coronavirus specialists at [address] [place] [address].    REMEMBER - a negative COVID test means you were negative AT THE TIME OF TESTING, and it is possible to have contracted COVID after being tested. Please keep continue your current medications as directed, including Insulin.  Diabetic diet.   Follow up with your primary Dr. for reevaluation of glucose, call Tuesday for appointment.       You were also seen and evaluated in the Emergency Department for your viral upper respiratory-like, possible COVID. You are stable for discharge, you should self-quarantine for 2 weeks for presumed COVID. Please see the attached COVID information packet for management of your symptoms, and more information on the next steps including your self-quarantine measures.     At this time your clinical evaluation and history do not demonstrate any acute, life-threatening medical conditions warranting emergent treatment. We strongly recommend you set up a follow up with your primary care doctor or with our Internal Medicine clinic (see contact information below).    [facility] Internal Medicine  General Internal Medicine  [address] [place] [address]  Phone: [phone]    Continue to maintain oral hydration, with plenty of fluids; take Tylenol (following the instructions on the medication insert information sheet for dosing) for fever control.  Do not exceed 3000mg in 24 hours of acetaminophen (Tylenol).     Should you develop new or worsening symptoms including but not limited to chest pain, shortness of breath, abdominal pain, fevers, vomiting, diarrhea - please return to the ED for immediate evaluation.      -------------    What is a coronavirus?  Coronaviruses are a large family of viruses that cause illnesses ranging from the common cold to more severe diseases such as Middle East Respiratory Syndrome (MERS) and Severe Acute Respiratory Syndrome (SARS).    What is Novel Coronavirus (COVID-19)?  The Centers for Disease Control and Prevention (CDC) is closely monitoring the outbreak caused by COVID-19. For the latest information about COVID-19, visit the CDC website at CDC.gov/Coronavirus    How are coronaviruses spread?  Coronaviruses can be transmitted from person to person, usually after close contact with an infected  person (for example, in a household, workplace, or healthcare setting), via droplets that become airborne after a cough or sneeze. These droplets can then infect a nearby person. Transmission can also occur by touching recently contaminated surfaces.    Is there a treatment for a COVID-19?  There is no specific treatment for disease caused by COVID-19. However, many of the symptoms can be treated based on the patient’s clinical condition. Supportive care for infected persons can be highly effective.    What are the symptoms of coronavirus infection?  It depends on the virus, but common signs include fever and/or respiratory symptoms such as cough and shortness of breath. In more severe cases, infection can cause pneumonia, severe acute respiratory syndrome, kidney failure and even death. Fortunately, most cases of COVID-19 have an illness no different than the influenza (flu), with a majority of these patients having mild symptoms and overall mortality which appears to be not much different than the flu.    What can I do to protect myself?  The best precautionary measures:  – washing your hands  – covering your cough  – disinfecting surfaces  – it is also advisable to avoid close contact with anyone showing symptoms of respiratory illness such as coughing and sneezing  – those with symptoms should wear a surgical mask when around others    What can I do to protect those around me?  If you have been identified as someone who may be infected with COVID-19, we recommend you follow the self-isolation procedures outlined on the following page to protect those around you and to limit the spread of this virus.    We recommend the below precautionary steps from now until 14 days from when you returned from your travel or date of your last known possible contact:    — Do not go to work, school or public areas. Avoid using public transportation, ridesharing or taxis.  — As much as possible, separate yourself from other people in your home. If you can, you should stay in a room and away from other people. Also, you should use a separate bathroom if available.  — Wear the supplied mask whenever you are around other people.  — If you have a non-urgent medical appointment, please reschedule for a later date. If the appointment is urgent, please call the health care provider and tell them that you are on self-isolation for possible COVID-19. This will help the health care provider’s office take steps to keep other people from getting infected or exposed. If you can reschedule routine appointments, do so.  — Wash your hands often with soap and water for at least 15 to 20 seconds or clean your hands with an alcohol-based hand  that contains 60 to 95% alcohol, covering all surfaces of your hands and rubbing them together until they feel dry. Soap and water should be used preferentially if hands are visibly dirty.  — Cover your mouth and nose with a tissue when you cough or sneeze. Throw used tissues in a lined trash can. Immediately wash your hands.  — Avoid touching your eyes, nose, and mouth with your hands.  — Avoid sharing personal household items. You should not share dishes, drinking glasses, cups, eating utensils, towels, or bedding with other people or pets in your home. After using these items, they should be washed thoroughly with soap and water.  — Clean and disinfect all “high-touch” surfaces every day. High touch surfaces include counters, tabletops, doorknobs, light switches, remote controls, bathroom fixtures, toilets, phones, keyboards, tablets, and bedside tables. Also, clean any surfaces that may have blood, stool, or body fluids on them.    ------------------------------------------  Information for patients who have received a COVID-19 test.    The COVID-19 (novel coronavirus) test  Results may take up to 7 days to become available.      If your result is positive, you will receive a phone call from one of our coronavirus specialists. While we will do our best to also call patients with a negative test result, the sheer volume of tests being performed may make this difficult to do in a timely fashion. If you haven’t heard from us within 5 days and you’d like to check on your results, you can check our Testif jose or call one of our coronavirus specialists at [address] [place] [address] (available 24/7)    Please DO NOT call the site where you received the test to obtain your results.    If the test is positive -   You will continue home isolation until you are completely well, you have no fever, and it has been at least 14 days since your positive test. The health department in your city or county may also contact you with additional instructions.    If your test is negative -    You will be able to stop home isolation and resume standard precautions, similiar to how you would manage the common cold or flu.  If you have any questions, you can reach out to one of our coronavirus specialists at [address] [place] [address].    REMEMBER - a negative COVID test means you were negative AT THE TIME OF TESTING, and it is possible to have contracted COVID after being tested.

## 2020-05-24 NOTE — ED PROVIDER NOTE - OBJECTIVE STATEMENT
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 Pt stated he's had mild cough/ sore throat since 4 weeks and noticed worsening cough with fever (103) yesterday. He's not f/ued with MD since the symptoms started, He took Tylenol at 10am. Denies headache, dizziness or neck pain. Denies CP/SOB/ABD pain or N/V/D. Denies sensory changes or weakness to extremities. Denies urinary problems. 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 Pt stated he's had mild cough/ sore throat since 4 weeks and noticed worsening cough with fever (103) yesterday. He's not f/ued with MD since the symptoms started, He took Tylenol at 10am. Denies headache, dizziness or neck pain. Denies CP/SOB/ABD pain or N/V/D. Denies sensory changes or weakness to extremities. Denies urinary problems.

## 2020-05-24 NOTE — ED ADULT NURSE NOTE - NS PRO PASSIVE SMOKE EXP
Vitals as charted. Remains on 1/2L nasal cannula at 21% FiO2. Top of isolette removed and warmer turned off at 2200 assessment as air temp was 26.9C, temps have been stable, and discussion had with NNP prior to initiating. Infant's temperatures stable post warmer shut off. After 0400 assessment and feed, infant noted to be having more frequent occurring desaturations, self resolving. RN made decision to replace top of warmer and turn isolette back on to see if any correlation between isolette wean and frequent desaturations. Infant tolerating feeds without emesis. Voiding and stooling. Infant continues to be closely monitored. Will alert care team to changes or concerns.    No

## 2020-05-24 NOTE — ED PROVIDER NOTE - PHYSICAL EXAMINATION
NAD, Tachycardia, Afebrile, Normal throat. Neck supple. Lungs clear. ABD soft, non tender. Neuro- intact.

## 2020-05-25 ENCOUNTER — TRANSCRIPTION ENCOUNTER (OUTPATIENT)
Age: 35
End: 2020-05-25

## 2020-05-26 ENCOUNTER — TRANSCRIPTION ENCOUNTER (OUTPATIENT)
Age: 35
End: 2020-05-26

## 2020-10-19 ENCOUNTER — APPOINTMENT (OUTPATIENT)
Dept: ORTHOPEDIC SURGERY | Facility: CLINIC | Age: 35
End: 2020-10-19
Payer: OTHER MISCELLANEOUS

## 2020-10-19 VITALS
BODY MASS INDEX: 36.65 KG/M2 | WEIGHT: 220 LBS | TEMPERATURE: 97.8 F | HEART RATE: 106 BPM | DIASTOLIC BLOOD PRESSURE: 83 MMHG | HEIGHT: 65 IN | SYSTOLIC BLOOD PRESSURE: 121 MMHG

## 2020-10-19 PROCEDURE — 99072 ADDL SUPL MATRL&STAF TM PHE: CPT

## 2020-10-19 PROCEDURE — 99203 OFFICE O/P NEW LOW 30 MIN: CPT

## 2020-10-19 NOTE — DISCUSSION/SUMMARY
[de-identified] : We discussed further treatment options both nonsurgical and surgical.  This point he wishes to continue with nonsurgical treatment.  Follow-up in 2 months.

## 2020-10-19 NOTE — HISTORY OF PRESENT ILLNESS
[de-identified] : Mr. 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  He has right buttock, lateral thigh, and knee pain.  He has numbness in his medial shin.  He has been doing physical therapy.  He did an BERNY last Tuesday.  The injection helped his pain but now he notices his numbness more.  Normal bowel and bladder control.   Denies any recent fevers, chills, sweats, weight loss, or infection.\par \par The patients past medical history, past surgical history, medications, allergies, and social history were reviewed by me today with the patient and documented accordingly.  In addition, the patient's family history, which is noncontributory to their visit, was also reviewed.\par

## 2020-10-19 NOTE — PHYSICAL EXAM
[Antalgic] : antalgic [Cane] : ambulates with cane [de-identified] : Review of his lumbar spine MRI reveals a right foraminal disc herniation at L3-4.  He also has a left paracentral disc herniation at L4-5 with moderate stenosis [de-identified] : Examination of the lumbar spine reveals no midline tenderness palpation, step-offs, or skin lesions. Decreased range of motion with respect to flexion, extension, lateral bending, and rotation. No tenderness to palpation of the sciatic notch. No tenderness palpation of the bilateral greater trochanters. No pain with passive internal/external rotation of the hips. No instability of bilateral lower extremities.  Negative BELLA. Negative straight leg raise bilaterally. No bowstring. Negative femoral stretch. 5 out of 5 iliopsoas, hip abductors, hips adductors, quadriceps, hamstrings, gastrocsoleus, tibialis anterior, extensor hallucis longus, peroneals. Grossly intact sensation to light touch bilateral lower extremities. 1+ patellar and Achilles reflexes. Downgoing Babinski. No clonus. Intact proprioception. Palpable pulses. No skin lesion and no edema on the right and left lower extremities.

## 2020-12-01 ENCOUNTER — FORM ENCOUNTER (OUTPATIENT)
Age: 35
End: 2020-12-01

## 2020-12-04 ENCOUNTER — APPOINTMENT (OUTPATIENT)
Dept: ORTHOPEDIC SURGERY | Facility: CLINIC | Age: 35
End: 2020-12-04
Payer: OTHER MISCELLANEOUS

## 2020-12-04 PROCEDURE — 99214 OFFICE O/P EST MOD 30 MIN: CPT

## 2020-12-04 PROCEDURE — 99072 ADDL SUPL MATRL&STAF TM PHE: CPT

## 2020-12-04 NOTE — HISTORY OF PRESENT ILLNESS
[de-identified] : Mr. 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   He is doing physical therapy.  He was feeling better but since it has gotten cold he feels his leg pain in his right knee has returned.  He is getting an BERNY on Tuesday.\par

## 2020-12-04 NOTE — PHYSICAL EXAM
[Antalgic] : antalgic [Cane] : ambulates with cane [de-identified] : Examination of the lumbar spine reveals no midline tenderness palpation, step-offs, or skin lesions. Decreased range of motion with respect to flexion, extension, lateral bending, and rotation. No tenderness to palpation of the sciatic notch. No tenderness palpation of the bilateral greater trochanters. No pain with passive internal/external rotation of the hips. No instability of bilateral lower extremities.  Negative BELLA. Negative straight leg raise bilaterally. No bowstring. Negative femoral stretch. 5 out of 5 iliopsoas, hip abductors, hips adductors, quadriceps, hamstrings, gastrocsoleus, tibialis anterior, extensor hallucis longus, peroneals. Grossly intact sensation to light touch bilateral lower extremities. 1+ patellar and Achilles reflexes. Downgoing Babinski. No clonus. Intact proprioception. Palpable pulses. No skin lesion and no edema on the right and left lower extremities. [de-identified] : Review of his lumbar spine MRI reveals a right foraminal disc herniation at L3-4.  He also has a left paracentral disc herniation at L4-5 with moderate stenosis

## 2020-12-04 NOTE — DISCUSSION/SUMMARY
[de-identified] : We discussed further treatment options both nonsurgical and surgical.  He wishes to continue with nonsurgical treatment.  He will continue with injections and therapy.  Follow-up in 2 months or sooner with any changes or worsening of his symptoms.

## 2020-12-06 ENCOUNTER — FORM ENCOUNTER (OUTPATIENT)
Age: 35
End: 2020-12-06

## 2020-12-15 ENCOUNTER — FORM ENCOUNTER (OUTPATIENT)
Age: 35
End: 2020-12-15

## 2021-02-03 ENCOUNTER — FORM ENCOUNTER (OUTPATIENT)
Age: 36
End: 2021-02-03

## 2021-02-28 ENCOUNTER — FORM ENCOUNTER (OUTPATIENT)
Age: 36
End: 2021-02-28

## 2021-03-05 ENCOUNTER — APPOINTMENT (OUTPATIENT)
Dept: ORTHOPEDIC SURGERY | Facility: CLINIC | Age: 36
End: 2021-03-05
Payer: OTHER MISCELLANEOUS

## 2021-03-05 PROCEDURE — 99214 OFFICE O/P EST MOD 30 MIN: CPT

## 2021-03-05 PROCEDURE — 99072 ADDL SUPL MATRL&STAF TM PHE: CPT

## 2021-03-05 NOTE — HISTORY OF PRESENT ILLNESS
[de-identified] : Mr. 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   He is doing physical therapy.  He has done 2 injections.  The first one helped and the second one did not.  He feels the same since his last visit.. He has back pain, right glute, thigh and right knee pain.  He has medial shin numbness. \par

## 2021-03-05 NOTE — DISCUSSION/SUMMARY
[de-identified] : We discussed further treatment options both nonsurgical and surgical.  We discussed the nature and purpose of both these compressive procedures as well as possible fusion.  At this point he is not interested in surgery.  He would like to try some additional physical therapy.  Follow-up afterwards or sooner with any changes or worsening of his symptoms.

## 2021-03-05 NOTE — PHYSICAL EXAM
[Antalgic] : antalgic [Cane] : ambulates with cane [de-identified] : Examination of the lumbar spine reveals no midline tenderness palpation, step-offs, or skin lesions. Decreased range of motion with respect to flexion, extension, lateral bending, and rotation. No tenderness to palpation of the sciatic notch. No tenderness palpation of the bilateral greater trochanters. No pain with passive internal/external rotation of the hips. No instability of bilateral lower extremities.  Negative BELLA. Negative straight leg raise bilaterally. No bowstring. Negative femoral stretch. 5 out of 5 iliopsoas, hip abductors, hips adductors, quadriceps, hamstrings, gastrocsoleus, tibialis anterior, extensor hallucis longus, peroneals. Grossly intact sensation to light touch bilateral lower extremities. 1+ patellar and Achilles reflexes. Downgoing Babinski. No clonus. Intact proprioception. Palpable pulses. No skin lesion and no edema on the right and left lower extremities. [de-identified] : Review of his lumbar spine MRI reveals a right foraminal disc herniation at L3-4.  He also has a left paracentral disc herniation at L4-5 with moderate stenosis

## 2021-05-21 ENCOUNTER — APPOINTMENT (OUTPATIENT)
Dept: ORTHOPEDIC SURGERY | Facility: CLINIC | Age: 36
End: 2021-05-21
Payer: OTHER MISCELLANEOUS

## 2021-05-21 VITALS
HEART RATE: 97 BPM | DIASTOLIC BLOOD PRESSURE: 96 MMHG | SYSTOLIC BLOOD PRESSURE: 143 MMHG | WEIGHT: 220 LBS | BODY MASS INDEX: 36.65 KG/M2 | HEIGHT: 65 IN | OXYGEN SATURATION: 99 %

## 2021-05-21 VITALS — TEMPERATURE: 96.6 F

## 2021-05-21 PROCEDURE — 99214 OFFICE O/P EST MOD 30 MIN: CPT

## 2021-05-21 PROCEDURE — 99072 ADDL SUPL MATRL&STAF TM PHE: CPT

## 2021-05-21 NOTE — PHYSICAL EXAM
[Antalgic] : antalgic [Cane] : ambulates with cane [de-identified] : Examination of the lumbar spine reveals no midline tenderness palpation, step-offs, or skin lesions. Decreased range of motion with respect to flexion, extension, lateral bending, and rotation. No tenderness to palpation of the sciatic notch. No tenderness palpation of the bilateral greater trochanters. No pain with passive internal/external rotation of the hips. No instability of bilateral lower extremities.  Negative BELLA. Negative straight leg raise bilaterally. No bowstring. Negative femoral stretch. 5 out of 5 iliopsoas, hip abductors, hips adductors, quadriceps, hamstrings, gastrocsoleus, tibialis anterior, extensor hallucis longus, peroneals. Grossly intact sensation to light touch bilateral lower extremities. 1+ patellar and Achilles reflexes. Downgoing Babinski. No clonus. Intact proprioception. Palpable pulses. No skin lesion and no edema on the right and left lower extremities. [de-identified] : Review of his lumbar spine MRI reveals a right foraminal disc herniation at L3-4.  He also has a left paracentral disc herniation at L4-5 with moderate stenosis

## 2021-05-21 NOTE — DISCUSSION/SUMMARY
[de-identified] : We again reviewed further treatment options.  This point he wishes to continue with nonsurgical treatment.  He wishes to continue physical therapy.  Updated prescription was given.  Follow-up afterwards or sooner with any changes or worsening of his symptoms.

## 2021-05-21 NOTE — HISTORY OF PRESENT ILLNESS
[de-identified] : Mr. 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   He is doing physical therapy and he feels it is helping.  He is 10% better compared to when he was last here.  He does continue to get right knee pain and numbness in his right shin and foot. \par

## 2021-07-05 ENCOUNTER — FORM ENCOUNTER (OUTPATIENT)
Age: 36
End: 2021-07-05

## 2021-08-06 ENCOUNTER — APPOINTMENT (OUTPATIENT)
Dept: ORTHOPEDIC SURGERY | Facility: CLINIC | Age: 36
End: 2021-08-06

## 2021-09-17 ENCOUNTER — APPOINTMENT (OUTPATIENT)
Dept: ORTHOPEDIC SURGERY | Facility: CLINIC | Age: 36
End: 2021-09-17
Payer: OTHER MISCELLANEOUS

## 2021-09-17 VITALS — TEMPERATURE: 96.8 F

## 2021-09-17 DIAGNOSIS — M51.26 OTHER INTERVERTEBRAL DISC DISPLACEMENT, LUMBAR REGION: ICD-10-CM

## 2021-09-17 DIAGNOSIS — M54.16 RADICULOPATHY, LUMBAR REGION: ICD-10-CM

## 2021-09-17 DIAGNOSIS — M48.07 SPINAL STENOSIS, LUMBOSACRAL REGION: ICD-10-CM

## 2021-09-17 PROCEDURE — 99214 OFFICE O/P EST MOD 30 MIN: CPT

## 2021-09-17 PROCEDURE — 99072 ADDL SUPL MATRL&STAF TM PHE: CPT

## 2021-09-17 NOTE — PHYSICAL EXAM
[Cane] : ambulates with cane [Antalgic] : not antalgic [de-identified] : Examination of the lumbar spine reveals no midline tenderness palpation, step-offs, or skin lesions. Decreased range of motion with respect to flexion, extension, lateral bending, and rotation. No tenderness to palpation of the sciatic notch. No tenderness palpation of the bilateral greater trochanters. No pain with passive internal/external rotation of the hips. No instability of bilateral lower extremities.  Negative BELLA. Negative straight leg raise bilaterally. No bowstring. Negative femoral stretch. 5 out of 5 iliopsoas, hip abductors, hips adductors, quadriceps, hamstrings, gastrocsoleus, tibialis anterior, extensor hallucis longus, peroneals. Grossly intact sensation to light touch bilateral lower extremities. 1+ patellar and Achilles reflexes. Downgoing Babinski. No clonus. Intact proprioception. Palpable pulses. No skin lesion and no edema on the right and left lower extremities. [de-identified] : Review of his lumbar spine MRI reveals a right foraminal disc herniation at L3-4.  He also has a left paracentral disc herniation at L4-5 with moderate stenosis

## 2021-09-17 NOTE — HISTORY OF PRESENT ILLNESS
[de-identified] : Mr. 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   He is doing physical therapy and he feels it is helping.  His right leg numbness is better. \par

## 2021-09-17 NOTE — DISCUSSION/SUMMARY
[de-identified] : We discussed further treatment options both nonsurgical and surgical.  Overall he feels his symptoms are improving.  He wishes to continue with nonsurgical treatment.  He would like to continue with some physical therapy.  Prescription was given.  Follow-up afterwards or sooner with any changes or worsening of his symptoms.

## 2021-09-22 ENCOUNTER — FORM ENCOUNTER (OUTPATIENT)
Age: 36
End: 2021-09-22

## 2021-09-26 ENCOUNTER — FORM ENCOUNTER (OUTPATIENT)
Age: 36
End: 2021-09-26

## 2022-10-26 ENCOUNTER — APPOINTMENT (OUTPATIENT)
Dept: ENDOCRINOLOGY | Facility: CLINIC | Age: 37
End: 2022-10-26

## 2024-01-06 ENCOUNTER — NON-APPOINTMENT (OUTPATIENT)
Age: 39
End: 2024-01-06

## 2024-02-06 ENCOUNTER — NON-APPOINTMENT (OUTPATIENT)
Age: 39
End: 2024-02-06

## 2024-08-03 ENCOUNTER — NON-APPOINTMENT (OUTPATIENT)
Age: 39
End: 2024-08-03

## 2024-08-12 ENCOUNTER — APPOINTMENT (OUTPATIENT)
Dept: OTOLARYNGOLOGY | Facility: CLINIC | Age: 39
End: 2024-08-12
Payer: COMMERCIAL

## 2024-08-12 ENCOUNTER — APPOINTMENT (OUTPATIENT)
Dept: OTOLARYNGOLOGY | Facility: CLINIC | Age: 39
End: 2024-08-12

## 2024-08-12 ENCOUNTER — NON-APPOINTMENT (OUTPATIENT)
Age: 39
End: 2024-08-12

## 2024-08-12 VITALS
HEART RATE: 108 BPM | WEIGHT: 219.01 LBS | SYSTOLIC BLOOD PRESSURE: 145 MMHG | HEIGHT: 65 IN | DIASTOLIC BLOOD PRESSURE: 94 MMHG | BODY MASS INDEX: 36.49 KG/M2

## 2024-08-12 DIAGNOSIS — H60.392 OTHER INFECTIVE OTITIS EXTERNA, LEFT EAR: ICD-10-CM

## 2024-08-12 DIAGNOSIS — I88.9 NONSPECIFIC LYMPHADENITIS, UNSPECIFIED: ICD-10-CM

## 2024-08-12 DIAGNOSIS — R22.1 LOCALIZED SWELLING, MASS AND LUMP, HEAD: ICD-10-CM

## 2024-08-12 DIAGNOSIS — R22.0 LOCALIZED SWELLING, MASS AND LUMP, HEAD: ICD-10-CM

## 2024-08-12 DIAGNOSIS — H92.02 OTALGIA, LEFT EAR: ICD-10-CM

## 2024-08-12 DIAGNOSIS — R25.2 CRAMP AND SPASM: ICD-10-CM

## 2024-08-12 PROCEDURE — 99244 OFF/OP CNSLTJ NEW/EST MOD 40: CPT

## 2024-08-12 PROCEDURE — 92550 TYMPANOMETRY & REFLEX THRESH: CPT

## 2024-08-12 PROCEDURE — 92557 COMPREHENSIVE HEARING TEST: CPT

## 2024-08-12 RX ORDER — TOBRAMYCIN AND DEXAMETHASONE 3; 1 MG/ML; MG/ML
0.3-0.1 SUSPENSION/ DROPS OPHTHALMIC
Qty: 1 | Refills: 6 | Status: ACTIVE | COMMUNITY
Start: 2024-08-12 | End: 1900-01-01

## 2024-08-12 RX ORDER — CEPHALEXIN 500 MG/1
500 TABLET ORAL
Qty: 20 | Refills: 0 | Status: ACTIVE | COMMUNITY
Start: 2024-08-12 | End: 1900-01-01

## 2024-08-12 NOTE — HISTORY OF PRESENT ILLNESS
[de-identified] : Patient presents stating his left ear has been hurting for the past couple of weeks. He states he thinks it started with a cut in his ear. He states he is diabetic. He states he went to the walk in and was given CiproDex and Advil. He states his ear feels more inflamed since starting the ear drops and he states developing lockjaw. He denies any known allergies to medication. Denies tinnitus. Denies effect on hearing. States he started using the drops 5 days ago with the last time he used them being last night. He states he is flying to [place] next week. He states he has been keeping his ear dry but holding ice on it occasionally to bring the inflammation down.

## 2024-08-12 NOTE — ASSESSMENT
[FreeTextEntry1] : Reviewed and reconciled medications, allergies, PMHx, PSHx, SocHx, FMHx   Patient presents stating his left ear has been hurting for the past couple of weeks. He states he thinks it started with a cut in his ear. He states he is diabetic. He states he went to the walk in and was given CiproDex and Advil. He states his ear feels more inflamed since starting the ear drops and he states developing lockjaw. He denies any known allergies to medication. Denies tinnitus. Denies effect on hearing. States he started using the drops 5 days ago with the last time he used them being last night. He states he is flying to [place] next week. He states he has been keeping his ear dry but holding ice on it occasionally to bring the inflammation down.   Physical Exam: -left ear: external canal is swollen and tender -tonsils class 2  Audio: -Type A Tymps AU -ETF Abnormal AU -Hearing -8000 Hz w/ mild to moderately-severe SNHL dip 4852-9444 Hz AS -100% discrimination at 50 dB AU  Plan: Audio - results interpreted by Dr. Ferreira and reviewed with the patient. -Start Tobradex ear drops - 4 drops TID left ear -Start Augmentin BID with food -FU in 10 days if symptoms do not improve

## 2024-08-12 NOTE — PHYSICAL EXAM
[Midline] : trachea located in midline position [Normal] : no rashes [Hearing Loss Right Only] : normal [Hearing Loss Left Only] : normal [FreeTextEntry7] : external canal is swollen and tender [de-identified] : class 2 [FreeTextEntry2] : sinuses nontender to percussion. sinuses nontender to percussion

## 2024-08-12 NOTE — ADDENDUM
[FreeTextEntry1] : Documented by Josie Rivera acting as scribe for Dr. Ferreira on 08/12/2024 All Medical record entries made by the Scribe were at my, Dr. Ferreira, direction and personally dictated by me on 08/12/2024  . I have reviewed the chart and agree that the record accurately reflects my personal performance of the history, physical exam, assessment and plan. I have also personally directed, reviewed, and agreed with the discharge instructions.

## 2024-08-12 NOTE — CONSULT LETTER
[Dear  ___] : Dear  [unfilled], [Consult Letter:] : I had the pleasure of evaluating your patient, [unfilled]. [Please see my note below.] : Please see my note below. [Consult Closing:] : Thank you very much for allowing me to participate in the care of this patient.  If you have any questions, please do not hesitate to contact me. [Sincerely,] : Sincerely, [FreeTextEntry3] : Eulogio Ferreira MD FACS

## 2024-08-12 NOTE — REVIEW OF SYSTEMS
[Ear Pain] : ear pain [Hearing Loss] : hearing loss [Recurrent Ear Infections] : recurrent ear infections [Negative] : Heme/Lymph [de-identified] : Headache

## 2024-08-20 ENCOUNTER — NON-APPOINTMENT (OUTPATIENT)
Age: 39
End: 2024-08-20

## 2024-08-21 ENCOUNTER — APPOINTMENT (OUTPATIENT)
Dept: OTOLARYNGOLOGY | Facility: CLINIC | Age: 39
End: 2024-08-21
Payer: COMMERCIAL

## 2024-08-21 VITALS
DIASTOLIC BLOOD PRESSURE: 90 MMHG | WEIGHT: 219.5 LBS | BODY MASS INDEX: 36.57 KG/M2 | HEIGHT: 65 IN | HEART RATE: 101 BPM | SYSTOLIC BLOOD PRESSURE: 134 MMHG

## 2024-08-21 DIAGNOSIS — H69.93 UNSPECIFIED EUSTACHIAN TUBE DISORDER, BILATERAL: ICD-10-CM

## 2024-08-21 DIAGNOSIS — R13.12 DYSPHAGIA, OROPHARYNGEAL PHASE: ICD-10-CM

## 2024-08-21 DIAGNOSIS — H90.3 SENSORINEURAL HEARING LOSS, BILATERAL: ICD-10-CM

## 2024-08-21 DIAGNOSIS — H60.8X2 OTHER OTITIS EXTERNA, LEFT EAR: ICD-10-CM

## 2024-08-21 PROCEDURE — 92550 TYMPANOMETRY & REFLEX THRESH: CPT

## 2024-08-21 PROCEDURE — 92557 COMPREHENSIVE HEARING TEST: CPT

## 2024-08-21 PROCEDURE — 99214 OFFICE O/P EST MOD 30 MIN: CPT

## 2024-08-21 NOTE — ASSESSMENT
[FreeTextEntry1] : Reviewed and reconciled medications, allergies, PMHx, PSHx, SocHx, FMHx.  Patient with h/o left OE, trismus, left otalgia, asymmetric SNHL, and cervical adenitis presents today for a follow up. He states that his ear pain has improved. He denies a difference in noise exposure between his ears. He states that his ears popped while he was on a plane. He states that he has difficulty swallowing because it always feels like there is something in his throat.  Audio 8/12/24: -Type A Tymps AU -ETF Abnormal AU -Hearing -8000 Hz w/ mild to moderately-severe SNHL dip 0390-2596 Hz AS -100% discrimination at 50 dB AU -right TPP: -18 -left TPP: -16   Physical exam: -NOSE score 0 -TNSS 0 -left ear canal: raised area -left TM: area of erythema and hypervascularity superior posterior  Audio 8/21/24: -Type A Tymps AU -ETF WNL AU -AD: Hearing -8000 Hz -AS: Hearing -2000 Hz, mild to moderate SNHL 3968-9303 Hz, rising to WNL 8527-6127 Hz -100% discrimination at 50 dB AD and at 55 dB AS -right TPP: -22 -left TPP: -8   Plan:  Audio - results interpreted by Dr. Ferreira and reviewed with the patient. -Ordered FEES -Ordered MRI Brain and IACs -FU with results from FEES and MRI

## 2024-08-21 NOTE — ADDENDUM
[FreeTextEntry1] :  Documented by Hawk Pearce acting as scribe for Dr. Ferreira on 08/21/2024. All Medical record entries made by the Scribe were at my, Dr. Ferreira, direction and personally dictated by me on 08/21/2024 . I have reviewed the chart and agree that the record accurately reflects my personal performance of the history, physical exam, assessment and plan. I have also personally directed, reviewed, and agreed with the discharge instructions.

## 2024-08-21 NOTE — CONSULT LETTER
[Dear  ___] : Dear  [unfilled], [Courtesy Letter:] : I had the pleasure of seeing your patient, [unfilled], in my office today. [Please see my note below.] : Please see my note below. [Consult Closing:] : Thank you very much for allowing me to participate in the care of this patient.  If you have any questions, please do not hesitate to contact me. [Sincerely,] : Sincerely, [FreeTextEntry3] :  Eulogio Ferreira MD FACS

## 2024-08-21 NOTE — DATA REVIEWED
[de-identified] : Audio 8/12/24: -Type A Tymps AU -ETF Abnormal AU -Hearing -8000 Hz w/ mild to moderately-severe SNHL dip 4596-6495 Hz AS -100% discrimination at 50 dB AU -right TPP: -18 -left TPP: -16  Audio 8/21/24: -Type A Tymps AU -ETF WNL AU -AD: Hearing -8000 Hz -AS: Hearing -2000 Hz, mild to moderate SNHL 9313-7518 Hz, rising to WNL 6981-9533 Hz -100% discrimination at 50 dB AD and at 55 dB AS -right TPP: -22 -left TPP: -8

## 2024-08-21 NOTE — PHYSICAL EXAM
[Normal] : the left mastoid was normal [Hearing Loss Left Only] : normal [Hearing Loss Right Only] : normal [FreeTextEntry9] : raised area [de-identified] : area of erythema and hypervascularity superior posterior

## 2024-08-21 NOTE — HISTORY OF PRESENT ILLNESS
[de-identified] : Patient with h/o left OE, trismus, left otalgia, asymmetric SNHL, and cervical adenitis presents today for a follow up. He states that his ear pain has improved. He denies a difference in noise exposure between his ears. He states that his ears popped while he was on a plane. He states that he has difficulty swallowing because it always feels like there is something in his throat.

## 2024-09-03 ENCOUNTER — APPOINTMENT (OUTPATIENT)
Dept: OTOLARYNGOLOGY | Facility: CLINIC | Age: 39
End: 2024-09-03

## 2024-09-20 ENCOUNTER — APPOINTMENT (OUTPATIENT)
Dept: OTOLARYNGOLOGY | Facility: CLINIC | Age: 39
End: 2024-09-20